# Patient Record
Sex: FEMALE
[De-identification: names, ages, dates, MRNs, and addresses within clinical notes are randomized per-mention and may not be internally consistent; named-entity substitution may affect disease eponyms.]

---

## 2020-01-13 PROBLEM — D48.5 NEOPLASM OF UNCERTAIN BEHAVIOR OF SKIN: Status: ACTIVE | Noted: 2020-01-13

## 2020-02-03 PROBLEM — D48.5 NEOPLASM OF UNCERTAIN BEHAVIOR OF SKIN: Status: ACTIVE | Noted: 2020-02-03

## 2020-03-13 ENCOUNTER — RX ONLY (RX ONLY)
Age: 85
End: 2020-03-13

## 2020-05-08 PROBLEM — D48.5 NEOPLASM OF UNCERTAIN BEHAVIOR OF SKIN: Status: ACTIVE | Noted: 2020-05-08

## 2020-09-08 ENCOUNTER — RX ONLY (RX ONLY)
Age: 85
End: 2020-09-08

## 2020-10-20 PROBLEM — D48.5 NEOPLASM OF UNCERTAIN BEHAVIOR OF SKIN: Status: ACTIVE | Noted: 2020-10-20

## 2020-12-08 PROBLEM — D48.5 NEOPLASM OF UNCERTAIN BEHAVIOR OF SKIN: Status: ACTIVE | Noted: 2020-12-08

## 2021-04-15 ENCOUNTER — RX ONLY (RX ONLY)
Age: 86
End: 2021-04-15

## 2021-10-13 ENCOUNTER — RX ONLY (RX ONLY)
Age: 86
End: 2021-10-13

## 2022-03-28 ENCOUNTER — RX ONLY (RX ONLY)
Age: 87
End: 2022-03-28

## 2022-08-25 ENCOUNTER — APPOINTMENT (OUTPATIENT)
Dept: URBAN - METROPOLITAN AREA CLINIC 205 | Age: 87
Setting detail: DERMATOLOGY
End: 2022-08-29

## 2022-08-25 DIAGNOSIS — L40.0 PSORIASIS VULGARIS: ICD-10-CM

## 2022-08-25 PROCEDURE — OTHER COUNSELING: OTHER

## 2022-08-25 PROCEDURE — OTHER TREATMENT REGIMEN: OTHER

## 2022-08-25 PROCEDURE — 99213 OFFICE O/P EST LOW 20 MIN: CPT

## 2022-08-25 PROCEDURE — OTHER INTRAMUSCULAR KENALOG: OTHER

## 2022-08-25 ASSESSMENT — LOCATION ZONE DERM: LOCATION ZONE: TRUNK

## 2022-08-25 ASSESSMENT — LOCATION SIMPLE DESCRIPTION DERM: LOCATION SIMPLE: LEFT BUTTOCK

## 2022-08-25 ASSESSMENT — LOCATION DETAILED DESCRIPTION DERM: LOCATION DETAILED: LEFT BUTTOCK

## 2022-08-25 NOTE — PROCEDURE: TREATMENT REGIMEN
Plan: She says she knows exactly what happened.  Her psoriasis was doing very well so she decided to use her amlactin more consistently.  That is when the irritation began.  Now everything she put on burns.  Hold amlactin (she already has). Discussed all risks and benefits of IM kenalog.  Her last kenalog injection was May 2021 here.  She has 40mg IM then.  Today her BP is 140/100.  She says it normally isn’t this way.  She feels it is elevated because her legs are uncomfortable and she has been rushing around today.  She follows with a cardiologist.  I told her to recheck her blood pressure at home or a grocery store when she feels more calm and comfortable tomorrow and if her BP is still elevated she needs to call cardiology.  She voiced understanding.  She only received 20mg of IM kenalog today because of her BP.  Her legs are too inflamed for topical treatment alone.  Everything that goes on her skin is burning.  She has osteopenia.  She is due for her reclast injection tomorrow.  I told her to ask the person who prescribes her reclast if she should put off this injection due to her kenalog injection.   It is an infrequent low enough dose of kenalog that it should not be contributing to her osteopenia.  She denies glaucoma or diabetes,  Okay to restart clobetasol ointment twice a day x 2 weeks in the next few days when legs are a little more calm. Elta md moisturizing cream is fine too.  Recheck in 2 weeks. Other Instructions: She says she knows exactly what happened.  Her psoriasis was doing very well so she decided to use her amlactin more consistently.  That is when the irritation began.  Now everything she put on burns.  Hold amlactin (she already has). Discussed all risks and benefits of IM kenalog.  Her last kenalog injection was May 2021 here.  She has 40mg IM then.  Today her BP is 140/100.  She says it normally isn’t this way.  She feels it is elevated because her legs are uncomfortable and she has been rushing around today.  She follows with a cardiologist.  I told her to recheck her blood pressure at home or a grocery store when she feels more calm and comfortable tomorrow and if her BP is still elevated she needs to call cardiology.  She voiced understanding.  She only received 20mg of IM kenalog today because of her BP.  Her legs are too inflamed for topical treatment alone.  Everything that goes on her skin is burning.  She has osteopenia.  She is due for her reclast injection tomorrow.  I told her to ask the person who prescribes her reclast if she should put off this injection due to her kenalog injection.   It is an infrequent low enough dose of kenalog that it should not be contributing to her osteopenia.  She denies glaucoma or diabetes,  Okay to restart clobetasol ointment twice a day x 2 weeks in the next few days when legs are a little more calm. Elta md moisturizing cream is fine too.  Recheck in 2 weeks.

## 2022-09-09 ENCOUNTER — APPOINTMENT (OUTPATIENT)
Dept: URBAN - METROPOLITAN AREA CLINIC 205 | Age: 87
Setting detail: DERMATOLOGY
End: 2022-09-09

## 2022-09-09 DIAGNOSIS — T1490XA CONTUSION OF UNSPECIFIED SITE: ICD-10-CM

## 2022-09-09 DIAGNOSIS — L40.0 PSORIASIS VULGARIS: ICD-10-CM

## 2022-09-09 PROBLEM — T14.8XXA OTHER INJURY OF UNSPECIFIED BODY REGION, INITIAL ENCOUNTER: Status: ACTIVE | Noted: 2022-09-09

## 2022-09-09 PROCEDURE — 99213 OFFICE O/P EST LOW 20 MIN: CPT

## 2022-09-09 PROCEDURE — OTHER COUNSELING: TOPICAL STEROIDS: OTHER

## 2022-09-09 PROCEDURE — OTHER COUNSELING: OTHER

## 2022-09-09 PROCEDURE — OTHER TREATMENT REGIMEN: OTHER

## 2022-09-09 ASSESSMENT — LOCATION DETAILED DESCRIPTION DERM: LOCATION DETAILED: RIGHT PROXIMAL PRETIBIAL REGION

## 2022-09-09 ASSESSMENT — LOCATION SIMPLE DESCRIPTION DERM: LOCATION SIMPLE: RIGHT PRETIBIAL REGION

## 2022-09-09 ASSESSMENT — LOCATION ZONE DERM: LOCATION ZONE: LEG

## 2022-09-09 NOTE — PROCEDURE: TREATMENT REGIMEN
Plan: She says this happened in the last 1 week or so when she ran into a stool that she put in the wrong place.  She has seen the nurse who does her orencia injections.  The nurse felt it would reabsorb on its own per pt report.  I feel this is likely to reabsorb as well.  However I think it would be best for her to be seen by wound care.  Refer to [facility] Plan: She says this happened in the last 1 week or so when she ran into a stool that she put in the wrong place.  She has seen the nurse who does her orencia injections.  The nurse felt it would reabsorb on its own per pt report.  I feel this is likely to reabsorb as well.  However I think it would be best for her to be seen by wound care.  Refer to [facility]

## 2022-09-09 NOTE — HPI: RASH (PSORIASIS)
How Severe Is Your Psoriasis?: mild
Is This A New Presentation, Or A Follow-Up?: Follow Up Psoriasis
Additional History: Legs improving since last evaluation Continue Cerave ointment qhs, elevate legs often, Using Clobetasol q week.  Have not used Amlactin past two weeks

## 2023-07-03 ENCOUNTER — RX ONLY (RX ONLY)
Age: 88
End: 2023-07-03

## 2023-07-03 RX ORDER — CLOBETASOL PROPIONATE 0.5 MG/G
OINTMENT TOPICAL
Qty: 30 | Refills: 0 | Status: ERX

## 2024-12-05 ENCOUNTER — APPOINTMENT (OUTPATIENT)
Dept: URBAN - METROPOLITAN AREA CLINIC 205 | Age: 89
Setting detail: DERMATOLOGY
End: 2024-12-05

## 2024-12-05 DIAGNOSIS — L40.0 PSORIASIS VULGARIS: ICD-10-CM

## 2024-12-05 DIAGNOSIS — D485 NEOPLASM OF UNCERTAIN BEHAVIOR OF SKIN: ICD-10-CM

## 2024-12-05 PROBLEM — D48.5 NEOPLASM OF UNCERTAIN BEHAVIOR OF SKIN: Status: ACTIVE | Noted: 2024-12-05

## 2024-12-05 PROCEDURE — 99213 OFFICE O/P EST LOW 20 MIN: CPT

## 2024-12-05 PROCEDURE — OTHER COUNSELING: TOPICAL STEROIDS: OTHER

## 2024-12-05 PROCEDURE — OTHER PRESCRIPTION: OTHER

## 2024-12-05 PROCEDURE — OTHER COUNSELING: OTHER

## 2024-12-05 PROCEDURE — OTHER TREATMENT REGIMEN: OTHER

## 2024-12-05 RX ORDER — CLOBETASOL PROPIONATE 0.5 MG/G
OINTMENT TOPICAL
Qty: 30 | Refills: 0 | Status: ERX | COMMUNITY
Start: 2024-12-05

## 2024-12-05 RX ORDER — MUPIROCIN 20 MG/G
OINTMENT TOPICAL
Qty: 22 | Refills: 0 | Status: ERX | COMMUNITY
Start: 2024-12-05

## 2024-12-05 ASSESSMENT — LOCATION ZONE DERM
LOCATION ZONE: LEG
LOCATION ZONE: EAR
LOCATION ZONE: EAR

## 2024-12-05 ASSESSMENT — LOCATION DETAILED DESCRIPTION DERM
LOCATION DETAILED: LEFT SUPERIOR HELIX
LOCATION DETAILED: RIGHT PROXIMAL PRETIBIAL REGION
LOCATION DETAILED: LEFT SUPERIOR HELIX

## 2024-12-05 ASSESSMENT — LOCATION SIMPLE DESCRIPTION DERM
LOCATION SIMPLE: RIGHT PRETIBIAL REGION
LOCATION SIMPLE: LEFT EAR
LOCATION SIMPLE: LEFT EAR

## 2024-12-05 NOTE — HPI: EVALUATION OF SKIN LESION(S)
Hpi Title: Evaluation of a Skin Lesion
Additional History: First noticed spot on ear 10 weeks ago. Patient is applying vaseline every night .

## 2024-12-05 NOTE — PROCEDURE: TREATMENT REGIMEN
Action 3: Continue
Detail Level: Generalized
Plan: she has been clear for 2 years but recently flared after numerous respiratory and urinary tract infections.
Continue Regimen: Cera Xin cream, Elta MD cream,
Initiate Regimen: clobetasol 0.05 % topical ointment: Use twice a day for two weeks on affected areas
Plan: If ear does not improve, I recommend referral to plastic surgery. We gave her the name of TA but she may prefer to schedule in [facility] where she lives.   She will let us know. She voiced understanding and agreed with the plan.  Information provided in writing to her.
Detail Level: Detailed
Initiate Treatment: clobetasol 0.05 % topical ointment Use twice a day for two weeks.\\nmupirocin 2 % topical ointment Apply to skin twice a day for two weeks.